# Patient Record
Sex: MALE | Race: WHITE | NOT HISPANIC OR LATINO | Employment: UNEMPLOYED | URBAN - METROPOLITAN AREA
[De-identification: names, ages, dates, MRNs, and addresses within clinical notes are randomized per-mention and may not be internally consistent; named-entity substitution may affect disease eponyms.]

---

## 2022-04-12 NOTE — TELEPHONE ENCOUNTER
Pt father is calling stating that he needs to transfer the pt medical records to Dr Parag Le the pt orthopedic dr that he has been seeing for the past 2 years is stating that we need to call them in order for them to fax over the records  Please call pt father when the records are received   Pt has an appt on 4-15    # [phone](VPZLAN)      [address] [facility] records dept#  [phone]

## 2022-04-12 NOTE — TELEPHONE ENCOUNTER
This is not an opinion given over the phone through an inbox message    This requires evaluation of the imaging, an exam, etc

## 2022-04-12 NOTE — TELEPHONE ENCOUNTER
Appt scheduled for 4/15  Patient bring records and imaging at time of appt  Will call to reschedule if trouble getting records  Fax number at [facility] given for [employer] [phone]

## 2022-04-12 NOTE — TELEPHONE ENCOUNTER
Spoke with Maykel Tatum and they will be faxing over medical records right away for this patient thru the [employer] number to [facility]
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
## 2022-04-13 NOTE — TELEPHONE ENCOUNTER
Records were received from [facility] and are scanned into [employer]  Father made aware

## 2022-04-15 ENCOUNTER — APPOINTMENT (OUTPATIENT)
Dept: RADIOLOGY | Facility: CLINIC | Age: 22
End: 2022-04-15
Payer: COMMERCIAL

## 2022-04-15 VITALS
WEIGHT: 130 LBS | BODY MASS INDEX: 20.4 KG/M2 | DIASTOLIC BLOOD PRESSURE: 60 MMHG | SYSTOLIC BLOOD PRESSURE: 100 MMHG | HEIGHT: 67 IN

## 2022-04-15 DIAGNOSIS — M25.572 PAIN, JOINT, ANKLE AND FOOT, LEFT: ICD-10-CM

## 2022-04-15 DIAGNOSIS — M19.072 ARTHRITIS OF LEFT SUBTALAR JOINT: ICD-10-CM

## 2022-04-15 DIAGNOSIS — M19.172 POST-TRAUMATIC ARTHRITIS OF ANKLE, LEFT: Primary | ICD-10-CM

## 2022-04-15 PROCEDURE — 73610 X-RAY EXAM OF ANKLE: CPT

## 2022-04-15 PROCEDURE — 99203 OFFICE O/P NEW LOW 30 MIN: CPT | Performed by: ORTHOPAEDIC SURGERY
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
## 2022-04-15 NOTE — PATIENT INSTRUCTIONS
[employer], [employer], [employer] are good brands but I recommend going to a dedicate shoe store (not [employer] or [employer] ) At these types of stores, they have experts that can fit you for shoes appropriate for your foot problem  [employer]  [address], [employer] [place] [employer] [address], [place], [address]    [facility]'s [address], [address] [place], [address] [place] [address]    [place] [employer]   [address], [address] [facility]  [address] [facility], [address]     [employer]  [address], [place], [address] [facility], [address] [place] [address]    [employer] [facility]  [address] [place], [place]

## 2022-04-21 ENCOUNTER — TELEPHONE (OUTPATIENT)
Dept: PAIN MEDICINE | Facility: CLINIC | Age: 22
End: 2022-04-21

## 2022-04-21 NOTE — TELEPHONE ENCOUNTER
Patient's father Chrissy Dickerson states patient has a fear of injections & can't go through with his injection scheduled today  He's cancelling it